# Patient Record
Sex: MALE | Race: WHITE | NOT HISPANIC OR LATINO | URBAN - METROPOLITAN AREA
[De-identification: names, ages, dates, MRNs, and addresses within clinical notes are randomized per-mention and may not be internally consistent; named-entity substitution may affect disease eponyms.]

---

## 2024-04-29 ENCOUNTER — TELEPHONE (OUTPATIENT)
Dept: PRIMARY CARE | Facility: CLINIC | Age: 44
End: 2024-04-29
Payer: COMMERCIAL

## 2024-04-29 NOTE — TELEPHONE ENCOUNTER
Brion called, he stated he bumped into you and you told him to schedule an appt. It has been 3 years since he was last seen, just need a verbal ok from you if that is fine to schedule

## 2024-05-01 ENCOUNTER — OFFICE VISIT (OUTPATIENT)
Dept: PRIMARY CARE | Facility: CLINIC | Age: 44
End: 2024-05-01
Payer: COMMERCIAL

## 2024-05-01 VITALS
OXYGEN SATURATION: 97 % | HEART RATE: 96 BPM | DIASTOLIC BLOOD PRESSURE: 70 MMHG | WEIGHT: 145.6 LBS | BODY MASS INDEX: 24.26 KG/M2 | TEMPERATURE: 98.2 F | SYSTOLIC BLOOD PRESSURE: 116 MMHG | HEIGHT: 65 IN

## 2024-05-01 DIAGNOSIS — K20.90 ESOPHAGITIS: ICD-10-CM

## 2024-05-01 DIAGNOSIS — E78.5 HYPERLIPIDEMIA, UNSPECIFIED HYPERLIPIDEMIA TYPE: ICD-10-CM

## 2024-05-01 DIAGNOSIS — L30.9 ECZEMA, UNSPECIFIED TYPE: ICD-10-CM

## 2024-05-01 DIAGNOSIS — Z00.00 ANNUAL PHYSICAL EXAM: Primary | ICD-10-CM

## 2024-05-01 DIAGNOSIS — Z11.4 ENCOUNTER FOR SCREENING FOR HIV: ICD-10-CM

## 2024-05-01 DIAGNOSIS — Z12.11 COLON CANCER SCREENING: ICD-10-CM

## 2024-05-01 DIAGNOSIS — Z11.59 ENCOUNTER FOR HEPATITIS C SCREENING TEST FOR LOW RISK PATIENT: ICD-10-CM

## 2024-05-01 DIAGNOSIS — Z72.0 NICOTINE ABUSE: ICD-10-CM

## 2024-05-01 DIAGNOSIS — I10 BENIGN ESSENTIAL HYPERTENSION: ICD-10-CM

## 2024-05-01 DIAGNOSIS — K40.90 LEFT INGUINAL HERNIA: ICD-10-CM

## 2024-05-01 PROBLEM — R16.0 HEPATOMEGALY: Status: ACTIVE | Noted: 2024-05-01

## 2024-05-01 PROBLEM — M79.603 ARM PAIN: Status: ACTIVE | Noted: 2024-05-01

## 2024-05-01 PROBLEM — R07.9 CHEST PAIN: Status: ACTIVE | Noted: 2024-05-01

## 2024-05-01 PROBLEM — R53.83 FATIGUE: Status: ACTIVE | Noted: 2024-05-01

## 2024-05-01 PROBLEM — J30.9 ALLERGIC RHINITIS: Status: ACTIVE | Noted: 2024-05-01

## 2024-05-01 PROBLEM — K64.9 HEMORRHOIDS: Status: ACTIVE | Noted: 2024-05-01

## 2024-05-01 PROBLEM — R20.2 PARESTHESIA: Status: ACTIVE | Noted: 2024-05-01

## 2024-05-01 PROBLEM — M54.12 CERVICAL RADICULITIS: Status: ACTIVE | Noted: 2024-05-01

## 2024-05-01 PROBLEM — G44.82 COITAL HEADACHE: Status: ACTIVE | Noted: 2024-05-01

## 2024-05-01 PROBLEM — R51.9 HEADACHE: Status: ACTIVE | Noted: 2024-05-01

## 2024-05-01 PROBLEM — G44.40 MEDICATION OVERUSE HEADACHE: Status: ACTIVE | Noted: 2024-05-01

## 2024-05-01 PROBLEM — G47.00 INSOMNIA: Status: ACTIVE | Noted: 2024-05-01

## 2024-05-01 PROBLEM — F13.10 MILD BENZODIAZEPINE USE DISORDER (MULTI): Status: ACTIVE | Noted: 2024-05-01

## 2024-05-01 PROBLEM — K92.1 BLOOD IN STOOL: Status: ACTIVE | Noted: 2024-05-01

## 2024-05-01 PROBLEM — R10.9 ABDOMINAL PAIN: Status: ACTIVE | Noted: 2024-05-01

## 2024-05-01 PROBLEM — K64.8 INTERNAL HEMORRHOIDS: Status: ACTIVE | Noted: 2024-05-01

## 2024-05-01 PROBLEM — J06.9 VIRAL URI WITH COUGH: Status: ACTIVE | Noted: 2024-05-01

## 2024-05-01 PROBLEM — S16.1XXA CERVICAL STRAIN: Status: ACTIVE | Noted: 2024-05-01

## 2024-05-01 PROBLEM — J32.9 SINUSITIS: Status: ACTIVE | Noted: 2024-05-01

## 2024-05-01 PROBLEM — F41.9 ANXIETY: Status: ACTIVE | Noted: 2024-05-01

## 2024-05-01 PROBLEM — D72.829 LEUKOCYTOSIS: Status: ACTIVE | Noted: 2024-05-01

## 2024-05-01 PROBLEM — K62.5 RECTAL BLEEDING: Status: ACTIVE | Noted: 2024-05-01

## 2024-05-01 PROBLEM — S62.366A CLOSED NONDISPLACED FRACTURE OF NECK OF FIFTH METACARPAL BONE OF RIGHT HAND: Status: ACTIVE | Noted: 2024-05-01

## 2024-05-01 PROBLEM — R41.3 MEMORY LOSS: Status: ACTIVE | Noted: 2024-05-01

## 2024-05-01 PROBLEM — G43.909 MIGRAINE: Status: ACTIVE | Noted: 2024-05-01

## 2024-05-01 PROBLEM — R79.89 ABNORMAL LIVER FUNCTION TEST: Status: ACTIVE | Noted: 2024-05-01

## 2024-05-01 PROBLEM — G89.29 CHRONIC NECK PAIN: Status: ACTIVE | Noted: 2024-05-01

## 2024-05-01 PROBLEM — H65.03 ACUTE SEROUS OTITIS MEDIA, BILATERAL: Status: ACTIVE | Noted: 2024-05-01

## 2024-05-01 PROBLEM — K21.9 GERD (GASTROESOPHAGEAL REFLUX DISEASE): Status: ACTIVE | Noted: 2024-05-01

## 2024-05-01 PROBLEM — R10.30 LOWER ABDOMINAL PAIN OF UNKNOWN ETIOLOGY: Status: ACTIVE | Noted: 2024-05-01

## 2024-05-01 PROBLEM — M54.2 CHRONIC NECK PAIN: Status: ACTIVE | Noted: 2024-05-01

## 2024-05-01 PROBLEM — E78.1 HYPERTRIGLYCERIDEMIA: Status: ACTIVE | Noted: 2024-05-01

## 2024-05-01 PROBLEM — K50.10: Status: ACTIVE | Noted: 2024-05-01

## 2024-05-01 PROBLEM — S69.91XA INJURY OF RIGHT HAND: Status: ACTIVE | Noted: 2024-05-01

## 2024-05-01 PROBLEM — R05.9 COUGH: Status: ACTIVE | Noted: 2024-05-01

## 2024-05-01 PROCEDURE — 99396 PREV VISIT EST AGE 40-64: CPT | Performed by: FAMILY MEDICINE

## 2024-05-01 PROCEDURE — 99214 OFFICE O/P EST MOD 30 MIN: CPT | Performed by: FAMILY MEDICINE

## 2024-05-01 PROCEDURE — 3078F DIAST BP <80 MM HG: CPT | Performed by: FAMILY MEDICINE

## 2024-05-01 PROCEDURE — 3074F SYST BP LT 130 MM HG: CPT | Performed by: FAMILY MEDICINE

## 2024-05-01 RX ORDER — ALCLOMETASONE DIPROPIONATE 0.5 MG/G
CREAM TOPICAL 2 TIMES DAILY
Qty: 45 G | Refills: 2 | Status: SHIPPED | OUTPATIENT
Start: 2024-05-01

## 2024-05-01 ASSESSMENT — ENCOUNTER SYMPTOMS: DEPRESSION: 0

## 2024-05-01 ASSESSMENT — PAIN SCALES - GENERAL: PAINLEVEL: 0-NO PAIN

## 2024-05-01 NOTE — PROGRESS NOTES
"Subjective   Patient ID: Ariel Andujar is a 43 y.o. male who presents for New Patient Visit (Establish care with pcp. ) and Mass (Bump on left side of abdominal for 2-3 months. ).    HPI     Works at [employer].  Bought a campground.    Review of Systems  Cardiovascular: no chest pain, no edema, no palpitations, and no syncope.   Respiratory: no cough,no shortness of breath, and no wheezing  Gastrointestinal: no abdominal pain, nausea, vomiting.  Occasionally gets blood in his stools from his hemorrhoids.  He had a colonoscopy 6 years ago  Genitourinary: no dysuria or hematuria    Objective   /70 (BP Location: Left arm, Patient Position: Sitting, BP Cuff Size: Adult)   Pulse 96   Temp 36.8 °C (98.2 °F) (Temporal)   Ht 1.651 m (5' 5\")   Wt 66 kg (145 lb 9.6 oz)   SpO2 97%   BMI 24.23 kg/m²     Physical Exam  Alert, pleasant and in no acute distress.  Neck: Supple, no JVD or carotid bruit  Heart: Regular rate and rhythm, no murmur  Lungs: Clear to auscultation  Lower extremities: No edema.  Mild excoriation at the lower anterior shin region bilaterally.  Faint erythema and scaling  Abdomen: Soft, nontender without palpable mass    Assessment/Plan   Problem List Items Addressed This Visit             ICD-10-CM    Benign essential hypertension I10    Relevant Orders    Urinalysis with Reflex Microscopic    Comprehensive Metabolic Panel    CBC    Lipid Panel     Other Visit Diagnoses         Codes    Annual physical exam    -  Primary Z00.00    Relevant Orders    Prostate Specific Antigen, Screen    Urinalysis with Reflex Microscopic    Comprehensive Metabolic Panel    CBC    Lipid Panel    Esophagitis     K20.90    Relevant Orders    EGD    Hyperlipidemia, unspecified hyperlipidemia type     E78.5    Relevant Orders    CT cardiac scoring wo IV contrast    Eczema, unspecified type     L30.9    Relevant Medications    alclometasone (Aclovate) 0.05 % cream    Left inguinal hernia     K40.90    Relevant " Orders    Referral to General Surgery    Nicotine abuse     Z72.0    Encounter for hepatitis C screening test for low risk patient     Z11.59    Relevant Orders    Hepatitis C antibody    Encounter for screening for HIV     Z11.4    Relevant Orders    HIV 1/2 Antigen/Antibody Screen with Reflex to Confirmation    Colon cancer screening     Z12.11    Relevant Orders    Colonoscopy Screening; Average Risk Patient        1.  Annual: Care gaps addressed  2.  Hypertension: Has been stable off medication.  3.  Esophagitis: Previous EGD with some esophagitis.  Will set up an EGD to reevaluate.  Patient currently taking as needed antacids.  4.  Hyperlipidemia: History of a negative CT calcium score.  It has been 5 years so we will repeat test  5.  Eczema: Mild eczematous rash on lower extremities.  Aclovate provide  6.  Left inguinal hernia: Referral to surgeon  7.  Nicotine abuse: Discussed the importance of quitting smoking.  Routine labs were ordered.  Will call 3 to 5 days after completion with results.

## 2024-05-08 ENCOUNTER — OFFICE VISIT (OUTPATIENT)
Dept: SURGERY | Facility: CLINIC | Age: 44
End: 2024-05-08
Payer: COMMERCIAL

## 2024-05-08 ENCOUNTER — LAB (OUTPATIENT)
Dept: LAB | Facility: LAB | Age: 44
End: 2024-05-08
Payer: COMMERCIAL

## 2024-05-08 VITALS
RESPIRATION RATE: 16 BRPM | SYSTOLIC BLOOD PRESSURE: 150 MMHG | OXYGEN SATURATION: 94 % | BODY MASS INDEX: 24.46 KG/M2 | HEIGHT: 65 IN | DIASTOLIC BLOOD PRESSURE: 97 MMHG | HEART RATE: 81 BPM | TEMPERATURE: 97.7 F | WEIGHT: 146.8 LBS

## 2024-05-08 DIAGNOSIS — Z00.00 ANNUAL PHYSICAL EXAM: ICD-10-CM

## 2024-05-08 DIAGNOSIS — I10 BENIGN ESSENTIAL HYPERTENSION: ICD-10-CM

## 2024-05-08 DIAGNOSIS — K40.90 LEFT INGUINAL HERNIA: Primary | ICD-10-CM

## 2024-05-08 DIAGNOSIS — Z11.59 ENCOUNTER FOR HEPATITIS C SCREENING TEST FOR LOW RISK PATIENT: ICD-10-CM

## 2024-05-08 DIAGNOSIS — Z11.4 ENCOUNTER FOR SCREENING FOR HIV: ICD-10-CM

## 2024-05-08 LAB
ALBUMIN SERPL BCP-MCNC: 4.7 G/DL (ref 3.4–5)
ALP SERPL-CCNC: 79 U/L (ref 33–120)
ALT SERPL W P-5'-P-CCNC: 17 U/L (ref 10–52)
ANION GAP SERPL CALC-SCNC: 14 MMOL/L (ref 10–20)
APPEARANCE UR: CLEAR
AST SERPL W P-5'-P-CCNC: 21 U/L (ref 9–39)
BILIRUB SERPL-MCNC: 0.7 MG/DL (ref 0–1.2)
BILIRUB UR STRIP.AUTO-MCNC: NEGATIVE MG/DL
BUN SERPL-MCNC: 11 MG/DL (ref 6–23)
CALCIUM SERPL-MCNC: 9.6 MG/DL (ref 8.6–10.3)
CHLORIDE SERPL-SCNC: 103 MMOL/L (ref 98–107)
CHOLEST SERPL-MCNC: 208 MG/DL (ref 0–199)
CHOLESTEROL/HDL RATIO: 3.7
CO2 SERPL-SCNC: 26 MMOL/L (ref 21–32)
COLOR UR: COLORLESS
CREAT SERPL-MCNC: 0.85 MG/DL (ref 0.5–1.3)
EGFRCR SERPLBLD CKD-EPI 2021: >90 ML/MIN/1.73M*2
ERYTHROCYTE [DISTWIDTH] IN BLOOD BY AUTOMATED COUNT: 11.9 % (ref 11.5–14.5)
GLUCOSE SERPL-MCNC: 83 MG/DL (ref 74–99)
GLUCOSE UR STRIP.AUTO-MCNC: NORMAL MG/DL
HCT VFR BLD AUTO: 45.7 % (ref 41–52)
HDLC SERPL-MCNC: 55.6 MG/DL
HGB BLD-MCNC: 15.8 G/DL (ref 13.5–17.5)
KETONES UR STRIP.AUTO-MCNC: NEGATIVE MG/DL
LDLC SERPL CALC-MCNC: 126 MG/DL
LEUKOCYTE ESTERASE UR QL STRIP.AUTO: NEGATIVE
MCH RBC QN AUTO: 33.2 PG (ref 26–34)
MCHC RBC AUTO-ENTMCNC: 34.6 G/DL (ref 32–36)
MCV RBC AUTO: 96 FL (ref 80–100)
NITRITE UR QL STRIP.AUTO: NEGATIVE
NON HDL CHOLESTEROL: 152 MG/DL (ref 0–149)
NRBC BLD-RTO: 0 /100 WBCS (ref 0–0)
PH UR STRIP.AUTO: 7 [PH]
PLATELET # BLD AUTO: 214 X10*3/UL (ref 150–450)
POTASSIUM SERPL-SCNC: 4.6 MMOL/L (ref 3.5–5.3)
PROT SERPL-MCNC: 7.2 G/DL (ref 6.4–8.2)
PROT UR STRIP.AUTO-MCNC: NEGATIVE MG/DL
PSA SERPL-MCNC: 1.18 NG/ML
RBC # BLD AUTO: 4.76 X10*6/UL (ref 4.5–5.9)
RBC # UR STRIP.AUTO: NEGATIVE /UL
SODIUM SERPL-SCNC: 138 MMOL/L (ref 136–145)
SP GR UR STRIP.AUTO: 1.01
TRIGL SERPL-MCNC: 131 MG/DL (ref 0–149)
UROBILINOGEN UR STRIP.AUTO-MCNC: NORMAL MG/DL
VLDL: 26 MG/DL (ref 0–40)
WBC # BLD AUTO: 13.3 X10*3/UL (ref 4.4–11.3)

## 2024-05-08 PROCEDURE — 3077F SYST BP >= 140 MM HG: CPT | Performed by: SURGERY

## 2024-05-08 PROCEDURE — 99203 OFFICE O/P NEW LOW 30 MIN: CPT | Performed by: SURGERY

## 2024-05-08 PROCEDURE — 86803 HEPATITIS C AB TEST: CPT

## 2024-05-08 PROCEDURE — 80053 COMPREHEN METABOLIC PANEL: CPT

## 2024-05-08 PROCEDURE — 87389 HIV-1 AG W/HIV-1&-2 AB AG IA: CPT

## 2024-05-08 PROCEDURE — 3080F DIAST BP >= 90 MM HG: CPT | Performed by: SURGERY

## 2024-05-08 PROCEDURE — 80061 LIPID PANEL: CPT

## 2024-05-08 PROCEDURE — 85027 COMPLETE CBC AUTOMATED: CPT

## 2024-05-08 PROCEDURE — 36415 COLL VENOUS BLD VENIPUNCTURE: CPT

## 2024-05-08 PROCEDURE — 81003 URINALYSIS AUTO W/O SCOPE: CPT

## 2024-05-08 PROCEDURE — 84153 ASSAY OF PSA TOTAL: CPT

## 2024-05-08 RX ORDER — CEFAZOLIN SODIUM 2 G/100ML
2 INJECTION, SOLUTION INTRAVENOUS ONCE
OUTPATIENT
Start: 2024-05-08 | End: 2024-05-08

## 2024-05-08 RX ORDER — SODIUM CHLORIDE, SODIUM LACTATE, POTASSIUM CHLORIDE, CALCIUM CHLORIDE 600; 310; 30; 20 MG/100ML; MG/100ML; MG/100ML; MG/100ML
100 INJECTION, SOLUTION INTRAVENOUS CONTINUOUS
OUTPATIENT
Start: 2024-05-08

## 2024-05-08 NOTE — PROGRESS NOTES
History Of Present Illness  HPI   The patient is a 43-year-old male referred by his primary care physician for a left inguinal hernia.  The patient noticed a left inguinal lump about 4 months ago.  The lump has been reducible.  He has mild intermittent discomfort.  No prior hernia repair.    Past medical history:  Right knee torn meniscus  Foot operation    Tobacco: 1 pack/day.  I advised him to stop smoking.  Allergies: Vicodin twice caused him to pass out.    Past Medical History  He has a past medical history of Anxiety disorder, unspecified, Encounter for other general counseling and advice on contraception, Ganglion, unspecified site, Other conditions influencing health status, Other conditions influencing health status, Pain in unspecified knee, Personal history of other diseases of male genital organs, Personal history of other diseases of the circulatory system, Personal history of other diseases of the digestive system, Personal history of other diseases of the digestive system, Personal history of other diseases of the musculoskeletal system and connective tissue, Personal history of other diseases of the nervous system and sense organs, Personal history of other diseases of the respiratory system, Personal history of other mental and behavioral disorders, Personal history of other specified conditions, Personal history of other specified conditions, and Sprain of ligaments of thoracic spine, initial encounter.    Surgical History  He has a past surgical history that includes Foot surgery (10/21/2014).     Allergies  Hydrocodone-acetaminophen    Social History  He reports that he has been smoking cigarettes. He has never used smokeless tobacco. He reports current alcohol use. He reports that he does not use drugs.    Family History  No family history on file.    Review of Systems  Review of Systems:  Constitutional:  no fever, no chills, no significant weight change  Neurological: No history of CVA or  "seizure disorder  Eyes: No pain, no recent visual change  ENT:  No recent hearing loss  Neck: No pain  Cardiovascular: No chest pain, no history of cardiac disease such as myocardial infarction or arrhythmia or congestive heart failure  Pulmonary: No shortness of breath, no history of pulmonary disease such as pneumonia or COPD  Breast: No history of breast disease or breast mass  Gastrointestinal:   no abdominal pain, no nausea or vomiting, no constipation or diarrhea or blood in the stool.  No history of ulcers.  No liver, gallbladder or pancreas disease.  No intestinal disorder.  Genitourinary: No hematuria or dysuria, no kidney disease  Musculoskeletal:  no arthralgia, no muscle or bone pain  Integumentary:  no rash  Psychiatric:  No anxiety or depression  Endocrine:  no history of diabetes  Hematologic/Lymphatic: No easy bruising or bleeding          Last Recorded Vitals  Blood pressure (!) 150/97, pulse 81, temperature 36.5 °C (97.7 °F), temperature source Temporal, resp. rate 16, height 1.651 m (5' 5\"), weight 66.6 kg (146 lb 12.8 oz), SpO2 94%.    Physical Exam  Constitutional: Well-developed, well-nourished, alert and oriented, no acute distress  Skin: Warm and dry, no lesions, no rashes, no jaundice  HEENT: Normocephalic, atraumatic, EOMI, no scleral icterus, eyes have no redness or swelling or discharge, external inspection of ears and nose is normal, mucous membranes moist  Neck: Soft, nontender, no mass or adenopathy  Cardiac: Regular rate and rhythm, no murmur  Chest: Patent airway, clear to auscultation, normal breath sounds with good chest expansion, no wheezes or rales or rhonchi noted, thorax symmetric  Abdomen: Nondistended, positive bowel sounds, soft, nontender, no mass.  Left inguinal hernia, reducible.  No right inguinal or femoral hernias palpable  Rectal: Not performed  Extremities: No injury, no lower extremity edema or calf tenderness  Lymphatic: No cervical adenopathy  Musculoskeletal: " Range of motion intact, no joint swelling, normal strength  Neurological: Alert and oriented x3, intact sensory and motor function, no obvious focal neurologic abnormalities, normal gait  Psychological: Appropriate mood and behavior  Patient declined a chaperone    Relevant Results       Assessment/Plan   Diagnoses and all orders for this visit:  Left inguinal hernia  -     Referral to General Surgery  Left inguinal hernia which is reducible, but symptomatic.  Recommend left inguinal herniorrhaphy with mesh either open or robotic assisted laparoscopic.  I discussed the procedure and risks with the patient. The risks include bleeding, infection, mesh infection, recurrence, injury to surrounding structures such as nerves/blood vessels/intestine/bladder, chronic postop pain, cardiac/pulmonary complications.   If the mesh becomes infected it could require excision.  I discussed the alternative of hernia repair without mesh and observation.  The patient wishes to have open repair of the left inguinal hernia with mesh.  Electronic consent signed.        Carlos Peraza MD

## 2024-05-08 NOTE — LETTER
May 8, 2024     Harley Garnica DO  [address]  [address]  [facility] [address]    Patient: Ariel Andujar   YOB: 1980   Date of Visit: 5/8/2024       Dear Dr. Harley Garnica DO:    Thank you for referring Ariel Andujar to me for evaluation. Below are my notes for this consultation.  If you have questions, please do not hesitate to call me. I look forward to following your patient along with you.       Sincerely,     Carlos Peraza MD      CC: No Recipients  ______________________________________________________________________________________    History Of Present Illness  HPI   The patient is a 43-year-old male referred by his primary care physician for a left inguinal hernia.  The patient noticed a left inguinal lump about 4 months ago.  The lump has been reducible.  He has mild intermittent discomfort.  No prior hernia repair.    Past medical history:  Right knee torn meniscus  Foot operation    Tobacco: 1 pack/day.  I advised him to stop smoking.  Allergies: Vicodin twice caused him to pass out.    Past Medical History  He has a past medical history of Anxiety disorder, unspecified, Encounter for other general counseling and advice on contraception, Ganglion, unspecified site, Other conditions influencing health status, Other conditions influencing health status, Pain in unspecified knee, Personal history of other diseases of male genital organs, Personal history of other diseases of the circulatory system, Personal history of other diseases of the digestive system, Personal history of other diseases of the digestive system, Personal history of other diseases of the musculoskeletal system and connective tissue, Personal history of other diseases of the nervous system and sense organs, Personal history of other diseases of the respiratory system, Personal history of other mental and behavioral disorders, Personal history of other specified conditions, Personal history of  "other specified conditions, and Sprain of ligaments of thoracic spine, initial encounter.    Surgical History  He has a past surgical history that includes Foot surgery (10/21/2014).     Allergies  Hydrocodone-acetaminophen    Social History  He reports that he has been smoking cigarettes. He has never used smokeless tobacco. He reports current alcohol use. He reports that he does not use drugs.    Family History  No family history on file.    Review of Systems  Review of Systems:  Constitutional:  no fever, no chills, no significant weight change  Neurological: No history of CVA or seizure disorder  Eyes: No pain, no recent visual change  ENT:  No recent hearing loss  Neck: No pain  Cardiovascular: No chest pain, no history of cardiac disease such as myocardial infarction or arrhythmia or congestive heart failure  Pulmonary: No shortness of breath, no history of pulmonary disease such as pneumonia or COPD  Breast: No history of breast disease or breast mass  Gastrointestinal:   no abdominal pain, no nausea or vomiting, no constipation or diarrhea or blood in the stool.  No history of ulcers.  No liver, gallbladder or pancreas disease.  No intestinal disorder.  Genitourinary: No hematuria or dysuria, no kidney disease  Musculoskeletal:  no arthralgia, no muscle or bone pain  Integumentary:  no rash  Psychiatric:  No anxiety or depression  Endocrine:  no history of diabetes  Hematologic/Lymphatic: No easy bruising or bleeding          Last Recorded Vitals  Blood pressure (!) 150/97, pulse 81, temperature 36.5 °C (97.7 °F), temperature source Temporal, resp. rate 16, height 1.651 m (5' 5\"), weight 66.6 kg (146 lb 12.8 oz), SpO2 94%.    Physical Exam  Constitutional: Well-developed, well-nourished, alert and oriented, no acute distress  Skin: Warm and dry, no lesions, no rashes, no jaundice  HEENT: Normocephalic, atraumatic, EOMI, no scleral icterus, eyes have no redness or swelling or discharge, external inspection of " ears and nose is normal, mucous membranes moist  Neck: Soft, nontender, no mass or adenopathy  Cardiac: Regular rate and rhythm, no murmur  Chest: Patent airway, clear to auscultation, normal breath sounds with good chest expansion, no wheezes or rales or rhonchi noted, thorax symmetric  Abdomen: Nondistended, positive bowel sounds, soft, nontender, no mass.  Left inguinal hernia, reducible.  No right inguinal or femoral hernias palpable  Rectal: Not performed  Extremities: No injury, no lower extremity edema or calf tenderness  Lymphatic: No cervical adenopathy  Musculoskeletal: Range of motion intact, no joint swelling, normal strength  Neurological: Alert and oriented x3, intact sensory and motor function, no obvious focal neurologic abnormalities, normal gait  Psychological: Appropriate mood and behavior  Patient declined a chaperone    Relevant Results       Assessment/Plan  Diagnoses and all orders for this visit:  Left inguinal hernia  -     Referral to General Surgery  Left inguinal hernia which is reducible, but symptomatic.  Recommend left inguinal herniorrhaphy with mesh either open or robotic assisted laparoscopic.  I discussed the procedure and risks with the patient. The risks include bleeding, infection, mesh infection, recurrence, injury to surrounding structures such as nerves/blood vessels/intestine/bladder, chronic postop pain, cardiac/pulmonary complications.   If the mesh becomes infected it could require excision.  I discussed the alternative of hernia repair without mesh and observation.  The patient wishes to have open repair of the left inguinal hernia with mesh.  Electronic consent signed.        Carlos Peraza MD

## 2024-05-09 LAB
HCV AB SER QL: NONREACTIVE
HIV 1+2 AB+HIV1 P24 AG SERPL QL IA: NONREACTIVE

## 2024-06-16 ENCOUNTER — APPOINTMENT (OUTPATIENT)
Dept: RADIOLOGY | Facility: HOSPITAL | Age: 44
End: 2024-06-16
Payer: COMMERCIAL

## 2024-07-01 DIAGNOSIS — I10 BENIGN ESSENTIAL HYPERTENSION: Primary | ICD-10-CM

## 2024-07-01 RX ORDER — VALSARTAN AND HYDROCHLOROTHIAZIDE 160; 25 MG/1; MG/1
1 TABLET ORAL DAILY
Qty: 90 TABLET | Refills: 1 | Status: SHIPPED | OUTPATIENT
Start: 2024-07-01 | End: 2024-12-28

## 2024-07-11 ENCOUNTER — APPOINTMENT (OUTPATIENT)
Dept: GASTROENTEROLOGY | Facility: CLINIC | Age: 44
End: 2024-07-11
Payer: COMMERCIAL

## 2024-07-11 VITALS
HEART RATE: 77 BPM | SYSTOLIC BLOOD PRESSURE: 149 MMHG | HEIGHT: 65 IN | DIASTOLIC BLOOD PRESSURE: 96 MMHG | WEIGHT: 146.4 LBS | BODY MASS INDEX: 24.39 KG/M2

## 2024-07-11 DIAGNOSIS — K57.90 DIVERTICULOSIS: ICD-10-CM

## 2024-07-11 DIAGNOSIS — K21.9 GASTROESOPHAGEAL REFLUX DISEASE, UNSPECIFIED WHETHER ESOPHAGITIS PRESENT: Primary | ICD-10-CM

## 2024-07-11 DIAGNOSIS — K62.5 RECTAL BLEEDING: ICD-10-CM

## 2024-07-11 PROCEDURE — 3080F DIAST BP >= 90 MM HG: CPT | Performed by: INTERNAL MEDICINE

## 2024-07-11 PROCEDURE — 3077F SYST BP >= 140 MM HG: CPT | Performed by: INTERNAL MEDICINE

## 2024-07-11 PROCEDURE — 3008F BODY MASS INDEX DOCD: CPT | Performed by: INTERNAL MEDICINE

## 2024-07-11 PROCEDURE — 99203 OFFICE O/P NEW LOW 30 MIN: CPT | Performed by: INTERNAL MEDICINE

## 2024-07-11 NOTE — PROGRESS NOTES
This 43-year-old man is here because of 2 sets of problems he gets constant heartburn he is to take Prilosec then he was worried about side effects and discontinued now he takes Tums all the time.  He has no dysphagia anorexia or weight loss.  He had an upper endoscopy 6 years ago.    He also has rectal bleeding he bleeds at least 3 times a week.  This is mostly when he is moving his bowels which is mixed with blood.  6 years ago he had an episode of diverticulitis subsequently had a colonoscopy which showed widemouth diverticuli in the sigmoid colon and area of inflammation in the sigmoid colon suggestive of diverticulitis.  He does take fiber tablets on daily basis.  He is rectal bleeding is mostly when he is moving his bowels and sometimes he sits in the toilet for 20 to 25 minutes because of discomfort and bleeding.  He is here for evaluation.  Past medical history no major surgical procedures.  He is scheduled to have a left inguinal hernia repair by Dr. Peraza.  Social history he is  with children he smokes a pack of cigarettes per day he is a weekend drinker.  Family history his father  of cirrhosis of the liver recently.  No family history of colorectal cancer.    Review of system he feels his blood pressure was high he went to the emergency room and released he is taking blood pressure medications.  He drinks over the weekend he is a 1 pack smoker.  Does not abuse any drugs.  He has no cough or sputum production.  He has no urinary symptoms he is generally anxious otherwise 10 point review of system is negative.  He is allergic to hydrocodone.    Physical examination vital signs was noted.  HEENT normal.  Carotid 2+ thyroid is not enlarged no adenopathy.  Oral cavity is well-hydrated.    Heart sounds were normal chest showed occasional rhonchi bilaterally abdominal examination shows no tenderness rigidity or guarding he is not ill he has a left inguinal hernia which is reducible.  Mental status  anxious.    Clinical impression rectal rectal bleeding most likely from hemorrhoids less likely from diverticulosis or adenoma.    2.  Gastroesophageal reflux disease  3.  Tobacco abuse.    Recommendation recommend that he should take Prilosec 20 mg once daily.  He should seriously consider cessation of smoking and drinking.  Which is aggravating his gastrointestinal problems.  He is also recommended to an upper GI endoscopy as well as colonoscopy for further evaluation of his symptoms.  Informed consent was obtained.  He is also advised to take high-fiber cereal every day in addition to fresh fruits and vegetables.

## 2024-07-11 NOTE — PROGRESS NOTES
his 43-year-old man is here because of 2 sets of problems he gets constant heartburn he is to take Prilosec then he was worried about side effects and discontinued now he takes Tums all the time.  He has no dysphagia anorexia or weight loss.  He had an upper endoscopy 6 years ago.     He also has rectal bleeding he bleeds at least 3 times a week.  This is mostly when he is moving his bowels which is mixed with blood.  6 years ago he had an episode of diverticulitis subsequently had a colonoscopy which showed widemouth diverticuli in the sigmoid colon and area of inflammation in the sigmoid colon suggestive of diverticulitis.  He does take fiber tablets on daily basis.  He is rectal bleeding is mostly when he is moving his bowels and sometimes he sits in the toilet for 20 to 25 minutes because of discomfort and bleeding.  He is here for evaluation.  Past medical history no major surgical procedures.  He is scheduled to have a left inguinal hernia repair by Dr. Peraza.  Social history he is  with children he smokes a pack of cigarettes per day he is a weekend drinker.  Family history his father  of cirrhosis of the liver recently.  No family history of colorectal cancer.     Review of system he feels his blood pressure was high he went to the emergency room and released he is taking blood pressure medications.  He drinks over the weekend he is a 1 pack smoker.  Does not abuse any drugs.  He has no cough or sputum production.  He has no urinary symptoms he is generally anxious otherwise 10 point review of system is negative.  He is allergic to hydrocodone.     Physical examination vital signs was noted.  HEENT normal.  Carotid 2+ thyroid is not enlarged no adenopathy.  Oral cavity is well-hydrated.     Heart sounds were normal chest showed occasional rhonchi bilaterally abdominal examination shows no tenderness rigidity or guarding he is not ill he has a left inguinal hernia which is reducible.  Mental  status anxious.     Clinical impression rectal rectal bleeding most likely from hemorrhoids less likely from diverticulosis or adenoma.     2.  Gastroesophageal reflux disease  3.  Tobacco abuse.     Recommendation recommend that he should take Prilosec 20 mg once daily.  He should seriously consider cessation of smoking and drinking.  Which is aggravating his gastrointestinal problems.  He is also recommended to an upper GI endoscopy as well as colonoscopy for further evaluation of his symptoms.  Informed consent was obtained.  He is also advised to take high-fiber cereal every day in addition to fresh fruits and vegetables.

## 2024-07-12 NOTE — H&P (VIEW-ONLY)
History Of Present Illness  HPI   May 8, 2024  The patient is a 43-year-old male referred by his primary care physician for a left inguinal hernia.  The patient noticed a left inguinal lump about 4 months ago.  The lump has been reducible.  He has mild intermittent discomfort.  No prior hernia repair.     July 15, 2024  The patient was going to wait until October to have his hernia repaired, but now has worsening left inguinal burning pain and wishes to schedule the operation sooner.  He has hypertension for which he was recently started on antihypertensive medications.    Past medical history:  Right knee torn meniscus  Foot operation     Tobacco: 1 pack/day.  I advised him to stop smoking.  Allergies: Vicodin twice caused him to pass out.     Past Medical History  He has a past medical history of Anxiety disorder, unspecified, Encounter for other general counseling and advice on contraception, Ganglion, unspecified site, Other conditions influencing health status, Other conditions influencing health status, Pain in unspecified knee, Personal history of other diseases of male genital organs, Personal history of other diseases of the circulatory system, Personal history of other diseases of the digestive system, Personal history of other diseases of the digestive system, Personal history of other diseases of the musculoskeletal system and connective tissue, Personal history of other diseases of the nervous system and sense organs, Personal history of other diseases of the respiratory system, Personal history of other mental and behavioral disorders, Personal history of other specified conditions, Personal history of other specified conditions, and Sprain of ligaments of thoracic spine, initial encounter.    Surgical History  He has a past surgical history that includes Foot surgery (10/21/2014).     Allergies  Hydrocodone-acetaminophen    Social History  He reports that he has been smoking cigarettes. He started  "smoking about 26 years ago. He has a 26.5 pack-year smoking history. He has never used smokeless tobacco. He reports current alcohol use. He reports current drug use. Drug: Marijuana.    Family History  Family History   Problem Relation Name Age of Onset    Pancreatic cancer Paternal Grandmother       Last Recorded Vitals  Blood pressure 130/80, pulse 87, temperature 36.7 °C (98.1 °F), temperature source Temporal, resp. rate 14, height 1.651 m (5' 5\"), weight 67.4 kg (148 lb 9.6 oz), SpO2 98%.    Physical Exam  Constitutional: Well-developed, well-nourished, alert and oriented, no acute distress  Skin: Warm and dry, no lesions, no rashes, no jaundice  HEENT: Normocephalic, atraumatic, EOMI, no scleral icterus, eyes have no redness or swelling or discharge, external inspection of ears and nose is normal, mucous membranes moist  Neck: Soft, nontender, no mass or adenopathy  Cardiac: Regular rate and rhythm, no murmur  Chest: Patent airway, clear to auscultation, normal breath sounds with good chest expansion, no wheezes or rales or rhonchi noted, thorax symmetric  Abdomen: Nondistended, positive bowel sounds, soft, nontender, no mass  Left inguinal hernia, reducible.  No femoral hernias or right inguinal hernia palpable.  Rectal: Not performed  Extremities: No injury, no lower extremity edema or calf tenderness  Lymphatic: No cervical adenopathy  Musculoskeletal: Range of motion intact, no joint swelling, normal strength  Neurological: Alert and oriented x3, intact sensory and motor function, no obvious focal neurologic abnormalities, normal gait  Psychological: Appropriate mood and behavior  Patient declined a chaperone    Relevant Results       Assessment/Plan   Diagnoses and all orders for this visit:  Left inguinal hernia      Left inguinal hernia which is reducible, but symptomatic.  Recommend left inguinal herniorrhaphy with mesh either open or robotic assisted laparoscopic.  I again discussed the procedure and " risks with the patient. The risks include bleeding, infection, mesh infection, recurrence, injury to surrounding structures such as nerves/blood vessels/intestine/bladder, chronic postop pain, cardiac/pulmonary complications.   If the mesh becomes infected it could require excision.  I have discussed the alternative of hernia repair without mesh and observation.  The patient wishes to have open repair of the left inguinal hernia with mesh.  Electronic consent has been signed.    Carlos Peraza MD

## 2024-07-15 ENCOUNTER — OFFICE VISIT (OUTPATIENT)
Dept: SURGERY | Facility: CLINIC | Age: 44
End: 2024-07-15
Payer: COMMERCIAL

## 2024-07-15 ENCOUNTER — APPOINTMENT (OUTPATIENT)
Dept: PRIMARY CARE | Facility: CLINIC | Age: 44
End: 2024-07-15
Payer: COMMERCIAL

## 2024-07-15 VITALS
RESPIRATION RATE: 14 BRPM | HEART RATE: 87 BPM | DIASTOLIC BLOOD PRESSURE: 80 MMHG | HEIGHT: 65 IN | BODY MASS INDEX: 24.76 KG/M2 | TEMPERATURE: 98.1 F | OXYGEN SATURATION: 98 % | WEIGHT: 148.6 LBS | SYSTOLIC BLOOD PRESSURE: 130 MMHG

## 2024-07-15 VITALS
HEART RATE: 98 BPM | TEMPERATURE: 97.5 F | BODY MASS INDEX: 24.4 KG/M2 | DIASTOLIC BLOOD PRESSURE: 80 MMHG | OXYGEN SATURATION: 96 % | WEIGHT: 146.6 LBS | SYSTOLIC BLOOD PRESSURE: 130 MMHG

## 2024-07-15 DIAGNOSIS — R07.89 OTHER CHEST PAIN: ICD-10-CM

## 2024-07-15 DIAGNOSIS — F41.9 ANXIETY: Primary | ICD-10-CM

## 2024-07-15 DIAGNOSIS — K40.90 LEFT INGUINAL HERNIA: Primary | ICD-10-CM

## 2024-07-15 PROCEDURE — 3079F DIAST BP 80-89 MM HG: CPT | Performed by: FAMILY MEDICINE

## 2024-07-15 PROCEDURE — 99214 OFFICE O/P EST MOD 30 MIN: CPT | Performed by: FAMILY MEDICINE

## 2024-07-15 PROCEDURE — 99213 OFFICE O/P EST LOW 20 MIN: CPT | Performed by: SURGERY

## 2024-07-15 PROCEDURE — 3075F SYST BP GE 130 - 139MM HG: CPT | Performed by: FAMILY MEDICINE

## 2024-07-15 PROCEDURE — 3079F DIAST BP 80-89 MM HG: CPT | Performed by: SURGERY

## 2024-07-15 PROCEDURE — 3075F SYST BP GE 130 - 139MM HG: CPT | Performed by: SURGERY

## 2024-07-15 RX ORDER — BUSPIRONE HYDROCHLORIDE 10 MG/1
10 TABLET ORAL 3 TIMES DAILY
Qty: 90 TABLET | Refills: 5 | Status: SHIPPED | OUTPATIENT
Start: 2024-07-15 | End: 2025-01-11

## 2024-07-15 ASSESSMENT — PAIN SCALES - GENERAL: PAINLEVEL: 4

## 2024-07-15 ASSESSMENT — ENCOUNTER SYMPTOMS: DEPRESSION: 0

## 2024-07-15 NOTE — LETTER
July 15, 2024     Harley Garnica DO  [address]  [address]  [facility] [address]    Patient: Ariel Andujar   YOB: 1980   Date of Visit: 7/15/2024       Dear Dr. Harley Garnica DO:    Thank you for referring Ariel Andujar to me for evaluation. Below are my notes for this consultation.  If you have questions, please do not hesitate to call me. I look forward to following your patient along with you.       Sincerely,     Carlos Peraza MD      CC: No Recipients  ______________________________________________________________________________________    History Of Present Illness  HPI   May 8, 2024  The patient is a 43-year-old male referred by his primary care physician for a left inguinal hernia.  The patient noticed a left inguinal lump about 4 months ago.  The lump has been reducible.  He has mild intermittent discomfort.  No prior hernia repair.     July 15, 2024  The patient was going to wait until October to have his hernia repaired, but now has worsening left inguinal burning pain and wishes to schedule the operation sooner.  He has hypertension for which he was recently started on antihypertensive medications.    Past medical history:  Right knee torn meniscus  Foot operation     Tobacco: 1 pack/day.  I advised him to stop smoking.  Allergies: Vicodin twice caused him to pass out.     Past Medical History  He has a past medical history of Anxiety disorder, unspecified, Encounter for other general counseling and advice on contraception, Ganglion, unspecified site, Other conditions influencing health status, Other conditions influencing health status, Pain in unspecified knee, Personal history of other diseases of male genital organs, Personal history of other diseases of the circulatory system, Personal history of other diseases of the digestive system, Personal history of other diseases of the digestive system, Personal history of other diseases of the musculoskeletal  "system and connective tissue, Personal history of other diseases of the nervous system and sense organs, Personal history of other diseases of the respiratory system, Personal history of other mental and behavioral disorders, Personal history of other specified conditions, Personal history of other specified conditions, and Sprain of ligaments of thoracic spine, initial encounter.    Surgical History  He has a past surgical history that includes Foot surgery (10/21/2014).     Allergies  Hydrocodone-acetaminophen    Social History  He reports that he has been smoking cigarettes. He started smoking about 26 years ago. He has a 26.5 pack-year smoking history. He has never used smokeless tobacco. He reports current alcohol use. He reports current drug use. Drug: Marijuana.    Family History  Family History   Problem Relation Name Age of Onset   • Pancreatic cancer Paternal Grandmother       Last Recorded Vitals  Blood pressure 130/80, pulse 87, temperature 36.7 °C (98.1 °F), temperature source Temporal, resp. rate 14, height 1.651 m (5' 5\"), weight 67.4 kg (148 lb 9.6 oz), SpO2 98%.    Physical Exam  Constitutional: Well-developed, well-nourished, alert and oriented, no acute distress  Skin: Warm and dry, no lesions, no rashes, no jaundice  HEENT: Normocephalic, atraumatic, EOMI, no scleral icterus, eyes have no redness or swelling or discharge, external inspection of ears and nose is normal, mucous membranes moist  Neck: Soft, nontender, no mass or adenopathy  Cardiac: Regular rate and rhythm, no murmur  Chest: Patent airway, clear to auscultation, normal breath sounds with good chest expansion, no wheezes or rales or rhonchi noted, thorax symmetric  Abdomen: Nondistended, positive bowel sounds, soft, nontender, no mass  Left inguinal hernia, reducible.  No femoral hernias or right inguinal hernia palpable.  Rectal: Not performed  Extremities: No injury, no lower extremity edema or calf tenderness  Lymphatic: No " cervical adenopathy  Musculoskeletal: Range of motion intact, no joint swelling, normal strength  Neurological: Alert and oriented x3, intact sensory and motor function, no obvious focal neurologic abnormalities, normal gait  Psychological: Appropriate mood and behavior  Patient declined a chaperone    Relevant Results       Assessment/Plan  Diagnoses and all orders for this visit:  Left inguinal hernia      Left inguinal hernia which is reducible, but symptomatic.  Recommend left inguinal herniorrhaphy with mesh either open or robotic assisted laparoscopic.  I again discussed the procedure and risks with the patient. The risks include bleeding, infection, mesh infection, recurrence, injury to surrounding structures such as nerves/blood vessels/intestine/bladder, chronic postop pain, cardiac/pulmonary complications.   If the mesh becomes infected it could require excision.  I have discussed the alternative of hernia repair without mesh and observation.  The patient wishes to have open repair of the left inguinal hernia with mesh.  Electronic consent has been signed.    Carlos Peraza MD

## 2024-07-15 NOTE — PROGRESS NOTES
Subjective   Patient ID: Ariel Andujar is a 43 y.o. male who presents for Follow-up (Emergency room follow up for tingling in both arms and chest feels heaving for over a week now. ).    HPI   Follow up ER  Felt hands tingle for a week.  Then got chest pain and passed out while driving.  ER workup was negative.  Has a long history of anxiety.  He feels his life is more stable so not clear why he is having anxiety.  He has not had any syncopal or presyncopal symptoms since event.  No palpitations.  No more chest pain.  CT cardiac score 0, 5 years ago  Review of Systems    Objective   /80 (BP Location: Left arm, Patient Position: Sitting, BP Cuff Size: Adult)   Pulse 98   Temp 36.4 °C (97.5 °F) (Temporal)   Wt 66.5 kg (146 lb 9.6 oz)   SpO2 96%   BMI 24.40 kg/m²     Physical Exam  Alert, pleasant and in no acute distress.  Heart: Regular rate and rhythm without murmur  Lungs: Clear to auscultation  Lower extremities: No edema  Assessment/Plan   Problem List Items Addressed This Visit             ICD-10-CM    Anxiety - Primary F41.9    Relevant Medications    busPIRone (Buspar) 10 mg tablet    Other Relevant Orders    EMG & nerve conduction    Chest pain R07.9   Patient for follow-up after ER evaluation.  I believe this is anxiety based.  We are going to start buspirone therapy.  Plan follow-up in 1 month.  As a side note complex patient has been having discomfort in his groin secondary to his hernia.  We will contact his surgeon to see if we can move up his re-evaluation date

## 2024-07-15 NOTE — Clinical Note
July 15, 2024       No Recipients    Patient: Ariel Andujar   YOB: 1980   Date of Visit: 7/15/2024       Dear Dr. Shi Recipients:    Thank you for referring Ariel Andujar to me for evaluation. Below are my notes for this consultation.  If you have questions, please do not hesitate to call me. I look forward to following your patient along with you.       Sincerely,     Carlos Peraza MD      CC:   No Recipients  ______________________________________________________________________________________    History Of Present Illness  HPI   May 8, 2024  The patient is a 43-year-old male referred by his primary care physician for a left inguinal hernia.  The patient noticed a left inguinal lump about 4 months ago.  The lump has been reducible.  He has mild intermittent discomfort.  No prior hernia repair.     July 15, 2024      Past medical history:  Right knee torn meniscus  Foot operation     Tobacco: 1 pack/day.  I advised him to stop smoking.  Allergies: Vicodin twice caused him to pass out.     Past Medical History  He has a past medical history of Anxiety disorder, unspecified, Encounter for other general counseling and advice on contraception, Ganglion, unspecified site, Other conditions influencing health status, Other conditions influencing health status, Pain in unspecified knee, Personal history of other diseases of male genital organs, Personal history of other diseases of the circulatory system, Personal history of other diseases of the digestive system, Personal history of other diseases of the digestive system, Personal history of other diseases of the musculoskeletal system and connective tissue, Personal history of other diseases of the nervous system and sense organs, Personal history of other diseases of the respiratory system, Personal history of other mental and behavioral disorders, Personal history of other specified conditions, Personal history of other specified conditions, and  Sprain of ligaments of thoracic spine, initial encounter.    Surgical History  He has a past surgical history that includes Foot surgery (10/21/2014).     Allergies  Hydrocodone-acetaminophen    Social History  He reports that he has been smoking cigarettes. He started smoking about 26 years ago. He has a 26.5 pack-year smoking history. He has never used smokeless tobacco. He reports current alcohol use. He reports current drug use. Drug: Marijuana.    Family History  Family History   Problem Relation Name Age of Onset    Pancreatic cancer Paternal Grandmother       Last Recorded Vitals  There were no vitals taken for this visit.    Physical Exam   ***    Relevant Results       Assessment/Plan  {Assess/PlanSmartLinks:41956}    Left inguinal hernia which is reducible, but symptomatic.  Recommend left inguinal herniorrhaphy with mesh either open or robotic assisted laparoscopic.  I discussed the procedure and risks with the patient. The risks include bleeding, infection, mesh infection, recurrence, injury to surrounding structures such as nerves/blood vessels/intestine/bladder, chronic postop pain, cardiac/pulmonary complications.   If the mesh becomes infected it could require excision.  I discussed the alternative of hernia repair without mesh and observation.  The patient wishes to have open repair of the left inguinal hernia with mesh.  Electronic consent has been signed.    Carlos Peraza MD

## 2024-07-18 NOTE — PROGRESS NOTES
This 43-year-old man was seen because of recurrent rectal bleeding.  He tells me he had a colonoscopy several years ago which apparently was negative he has persistent rectal bleeding usually with bowel movements this is thought to be from hemorrhoids he is very worried about it he also wanted to have a colonoscopy because of rectal bleeding and also for colorectal cancer screening.    In addition to this this gentleman also has severe heartburn and epigastric distress.  He does smoke cigarettes as well as use alcohol.    He says inguinal hernia for which she is going to have surgery by Dr. Rogers.    Family history is negative for colorectal cancer but is positive for pancreatic cancer.    A 10 point review of system is positive for generalized anxiety hypertension chronic cough without sputum production no chest pain rectal bleeding a lot of heartburn no dysphagia or weight loss.  No urinary symptoms.    At the time of examination very pleasant but anxious gentleman HEENT is unremarkable oral cavity is dry carotid 2+ JVD is flat thyroid is not enlarged no adenopathy heart sounds were normal chest showed some scattered rhonchi bilaterally abdominal exam is negative he is has a left inguinal hernia which is going to be repaired no other masses are noted no peripheral edema mental status anxious without focal deficits.  Clinical impression gastroesophageal reflux seem to have quite severe symptoms he does take PPI ongoing basis as needed.  He also has recurrent rectal bleeding perhaps from hemorrhoid but should also rule out any colorectal adenomas and will require a colonoscopy aspirin recommendation an upper GI endoscopy and colonoscopy examination    I counseled him regarding the use of tobacco and alcohol and encouraged to quit both.

## 2024-07-22 ENCOUNTER — TELEPHONE (OUTPATIENT)
Dept: GASTROENTEROLOGY | Facility: CLINIC | Age: 44
End: 2024-07-22
Payer: COMMERCIAL

## 2024-07-22 DIAGNOSIS — Z12.11 COLON CANCER SCREENING: Primary | ICD-10-CM

## 2024-07-22 RX ORDER — SODIUM, POTASSIUM,MAG SULFATES 17.5-3.13G
SOLUTION, RECONSTITUTED, ORAL ORAL
Qty: 354 ML | Refills: 0 | Status: SHIPPED | OUTPATIENT
Start: 2024-07-22

## 2024-07-22 NOTE — TELEPHONE ENCOUNTER
Patient called stating they need the prep still sent out for colonoscopy Wednesday 7/24/24 and should be sent to address below thank you   [facility] [address] [place] [address]

## 2024-07-24 ENCOUNTER — HOSPITAL ENCOUNTER (OUTPATIENT)
Dept: GASTROENTEROLOGY | Facility: HOSPITAL | Age: 44
Setting detail: OUTPATIENT SURGERY
Discharge: HOME | End: 2024-07-24
Payer: COMMERCIAL

## 2024-07-24 ENCOUNTER — ANESTHESIA (OUTPATIENT)
Dept: GASTROENTEROLOGY | Facility: HOSPITAL | Age: 44
End: 2024-07-24
Payer: COMMERCIAL

## 2024-07-24 ENCOUNTER — ANESTHESIA EVENT (OUTPATIENT)
Dept: GASTROENTEROLOGY | Facility: HOSPITAL | Age: 44
End: 2024-07-24
Payer: COMMERCIAL

## 2024-07-24 VITALS
OXYGEN SATURATION: 99 % | BODY MASS INDEX: 24.76 KG/M2 | HEART RATE: 78 BPM | TEMPERATURE: 96.8 F | RESPIRATION RATE: 18 BRPM | HEIGHT: 65 IN | DIASTOLIC BLOOD PRESSURE: 92 MMHG | SYSTOLIC BLOOD PRESSURE: 117 MMHG | WEIGHT: 148.59 LBS

## 2024-07-24 DIAGNOSIS — Z12.11 COLON CANCER SCREENING: ICD-10-CM

## 2024-07-24 DIAGNOSIS — K20.90 ESOPHAGITIS: ICD-10-CM

## 2024-07-24 PROCEDURE — 7100000009 HC PHASE TWO TIME - INITIAL BASE CHARGE

## 2024-07-24 PROCEDURE — 2500000004 HC RX 250 GENERAL PHARMACY W/ HCPCS (ALT 636 FOR OP/ED): Performed by: INTERNAL MEDICINE

## 2024-07-24 PROCEDURE — 45378 DIAGNOSTIC COLONOSCOPY: CPT | Performed by: INTERNAL MEDICINE

## 2024-07-24 PROCEDURE — 3700000001 HC GENERAL ANESTHESIA TIME - INITIAL BASE CHARGE

## 2024-07-24 PROCEDURE — 3700000002 HC GENERAL ANESTHESIA TIME - EACH INCREMENTAL 1 MINUTE

## 2024-07-24 PROCEDURE — 43239 EGD BIOPSY SINGLE/MULTIPLE: CPT | Performed by: INTERNAL MEDICINE

## 2024-07-24 PROCEDURE — 2500000005 HC RX 250 GENERAL PHARMACY W/O HCPCS: Performed by: ANESTHESIOLOGIST ASSISTANT

## 2024-07-24 PROCEDURE — A45378 PR COLONOSCOPY,DIAGNOSTIC: Performed by: ANESTHESIOLOGIST ASSISTANT

## 2024-07-24 PROCEDURE — 2500000004 HC RX 250 GENERAL PHARMACY W/ HCPCS (ALT 636 FOR OP/ED): Performed by: ANESTHESIOLOGIST ASSISTANT

## 2024-07-24 PROCEDURE — A45378 PR COLONOSCOPY,DIAGNOSTIC: Performed by: ANESTHESIOLOGY

## 2024-07-24 PROCEDURE — 7100000010 HC PHASE TWO TIME - EACH INCREMENTAL 1 MINUTE

## 2024-07-24 RX ORDER — SODIUM CHLORIDE, SODIUM LACTATE, POTASSIUM CHLORIDE, CALCIUM CHLORIDE 600; 310; 30; 20 MG/100ML; MG/100ML; MG/100ML; MG/100ML
20 INJECTION, SOLUTION INTRAVENOUS CONTINUOUS
Status: DISCONTINUED | OUTPATIENT
Start: 2024-07-24 | End: 2024-07-25 | Stop reason: HOSPADM

## 2024-07-24 RX ORDER — PROPOFOL 10 MG/ML
INJECTION, EMULSION INTRAVENOUS AS NEEDED
Status: DISCONTINUED | OUTPATIENT
Start: 2024-07-24 | End: 2024-07-24

## 2024-07-24 RX ORDER — LIDOCAINE HCL/PF 100 MG/5ML
SYRINGE (ML) INTRAVENOUS AS NEEDED
Status: DISCONTINUED | OUTPATIENT
Start: 2024-07-24 | End: 2024-07-24

## 2024-07-24 SDOH — HEALTH STABILITY: MENTAL HEALTH: CURRENT SMOKER: 1

## 2024-07-24 ASSESSMENT — PAIN - FUNCTIONAL ASSESSMENT
PAIN_FUNCTIONAL_ASSESSMENT: 0-10

## 2024-07-24 ASSESSMENT — COLUMBIA-SUICIDE SEVERITY RATING SCALE - C-SSRS
6. HAVE YOU EVER DONE ANYTHING, STARTED TO DO ANYTHING, OR PREPARED TO DO ANYTHING TO END YOUR LIFE?: NO
2. HAVE YOU ACTUALLY HAD ANY THOUGHTS OF KILLING YOURSELF?: NO
1. IN THE PAST MONTH, HAVE YOU WISHED YOU WERE DEAD OR WISHED YOU COULD GO TO SLEEP AND NOT WAKE UP?: NO

## 2024-07-24 ASSESSMENT — PAIN SCALES - GENERAL
PAINLEVEL_OUTOF10: 0 - NO PAIN
PAIN_LEVEL: 0
PAINLEVEL_OUTOF10: 0 - NO PAIN
PAINLEVEL_OUTOF10: 0 - NO PAIN

## 2024-07-24 NOTE — ANESTHESIA POSTPROCEDURE EVALUATION
Patient: Ariel Andujar    Procedure Summary       Date: 07/24/24 Room / Location: [facility]    Anesthesia Start: 1015 Anesthesia Stop: 1117    Procedures:       COLONOSCOPY      EGD Diagnosis:       Colon cancer screening      Esophagitis    Scheduled Providers: Gee Do MD Responsible Provider: Victor Manuel Tapia MD    Anesthesia Type: MAC ASA Status: 2            Anesthesia Type: MAC    Vitals Value Taken Time   /93 07/24/24 1130   Temp 36 °C (96.8 °F) 07/24/24 1116   Pulse 78 07/24/24 1130   Resp 18 07/24/24 1130   SpO2 96 % 07/24/24 1130       Anesthesia Post Evaluation    Patient location during evaluation: PACU  Patient participation: complete - patient participated  Level of consciousness: awake  Pain score: 0  Pain management: adequate  Airway patency: patent  Cardiovascular status: acceptable and hemodynamically stable  Respiratory status: nasal cannula  Hydration status: acceptable  Postoperative Nausea and Vomiting: none        No notable events documented.

## 2024-07-24 NOTE — DISCHARGE INSTRUCTIONS

## 2024-07-24 NOTE — ANESTHESIA PREPROCEDURE EVALUATION
Patient: Ariel Andujar    Procedure Information       Date/Time: 07/24/24 1000    Scheduled providers: Gee Do MD    Procedures:       COLONOSCOPY      EGD    Location: [facility]            Relevant Problems   Cardiac   (+) Benign essential hypertension   (+) Chest pain   (+) Hypertriglyceridemia      Neuro   (+) Anxiety   (+) Cervical radiculitis      GI   (+) GERD (gastroesophageal reflux disease)   (+) Rectal bleeding      Liver   (+) Hepatomegaly      Musculoskeletal   (+) Chronic neck pain      HEENT   (+) Sinusitis      ID   (+) Viral URI with cough       Clinical information reviewed:    Allergies  Meds               NPO Detail:  NPO/Void Status  Date of Last Liquid: 07/23/24  Time of Last Liquid: 2200  Date of Last Solid: 07/22/24  Time of Last Solid: 1830         Physical Exam    Airway  Mallampati: III  TM distance: <3 FB  Neck ROM: full     Cardiovascular - normal exam  Rhythm: regular  Rate: normal     Dental - normal exam     Pulmonary - normal exam     Abdominal            Anesthesia Plan    History of general anesthesia?: yes  History of complications of general anesthesia?: no    ASA 2     MAC     The patient is a current smoker.  Patient was previously instructed to abstain from smoking on day of procedure.  Patient did not smoke on day of procedure.    intravenous induction   Anesthetic plan and risks discussed with patient.    Plan discussed with CAA.

## 2024-07-29 NOTE — PREPROCEDURE INSTRUCTIONS
Current Medications   Medication Instructions    alclometasone (Aclovate) 0.05 % cream Hold am of surgery    busPIRone (Buspar) 10 mg tablet Take in am with sip of water if needed    valsartan-hydrochlorothiazide (Diovan-HCT) 160-25 mg tablet Hold am of surgery          NPO Instructions: Nothing to eat after midnight, may have 13.5 ounces of a clear liquid two hours prior to arrival to the hospital (completed by 0400). Stop smoking cigarettes and marijuana today and am of surgery.     Additional Instructions: Enter through main entrance of [facility], located at [address] [facility]. Proceed to registration, located in the back right hand corner. You will need your ID and insurance card for registration. Please ensure you have a responsible adult to drive you home.     Take a shower the morning of or night before your procedure. After you shower avoid lotions, powders, deodorants or anything applied to the skin. If you wear contacts or glasses, wear the glasses. If you do not have glasses, please bring a case for your contacts. You may wear hearing aids and dentures, bring a case for them or we will provide one. Make sure you wear something loose and comfortable. Keep in mind your surgery type and wear something that will accommodate incisions or bandages. Please remove all jewelry.     For further questions Familia MEYER can be contacted at [phone] between 7AM-3PM.

## 2024-07-30 ENCOUNTER — ANESTHESIA EVENT (OUTPATIENT)
Dept: OPERATING ROOM | Facility: HOSPITAL | Age: 44
End: 2024-07-30
Payer: COMMERCIAL

## 2024-07-30 ENCOUNTER — ANESTHESIA (OUTPATIENT)
Dept: OPERATING ROOM | Facility: HOSPITAL | Age: 44
End: 2024-07-30
Payer: COMMERCIAL

## 2024-07-30 ENCOUNTER — HOSPITAL ENCOUNTER (OUTPATIENT)
Facility: HOSPITAL | Age: 44
Setting detail: OUTPATIENT SURGERY
Discharge: HOME | End: 2024-07-30
Attending: SURGERY | Admitting: SURGERY
Payer: COMMERCIAL

## 2024-07-30 VITALS
HEIGHT: 65 IN | TEMPERATURE: 96.8 F | WEIGHT: 148.59 LBS | RESPIRATION RATE: 16 BRPM | DIASTOLIC BLOOD PRESSURE: 84 MMHG | OXYGEN SATURATION: 96 % | HEART RATE: 64 BPM | SYSTOLIC BLOOD PRESSURE: 118 MMHG | BODY MASS INDEX: 24.76 KG/M2

## 2024-07-30 DIAGNOSIS — K40.90 LEFT INGUINAL HERNIA: Primary | ICD-10-CM

## 2024-07-30 PROBLEM — Z86.79 HISTORY OF HYPERTENSION: Status: ACTIVE | Noted: 2024-07-30

## 2024-07-30 PROBLEM — K92.2 GASTROINTESTINAL HEMORRHAGE: Status: ACTIVE | Noted: 2024-07-30

## 2024-07-30 PROCEDURE — 7100000002 HC RECOVERY ROOM TIME - EACH INCREMENTAL 1 MINUTE: Performed by: SURGERY

## 2024-07-30 PROCEDURE — 7100000010 HC PHASE TWO TIME - EACH INCREMENTAL 1 MINUTE: Performed by: SURGERY

## 2024-07-30 PROCEDURE — A49505 PR REPAIR ING HERNIA,5+Y/O,REDUCIBL: Performed by: ANESTHESIOLOGIST ASSISTANT

## 2024-07-30 PROCEDURE — 2500000004 HC RX 250 GENERAL PHARMACY W/ HCPCS (ALT 636 FOR OP/ED): Performed by: ANESTHESIOLOGY

## 2024-07-30 PROCEDURE — 93005 ELECTROCARDIOGRAM TRACING: CPT | Mod: 59

## 2024-07-30 PROCEDURE — 2720000007 HC OR 272 NO HCPCS: Performed by: SURGERY

## 2024-07-30 PROCEDURE — 7100000009 HC PHASE TWO TIME - INITIAL BASE CHARGE: Performed by: SURGERY

## 2024-07-30 PROCEDURE — 3700000002 HC GENERAL ANESTHESIA TIME - EACH INCREMENTAL 1 MINUTE: Performed by: SURGERY

## 2024-07-30 PROCEDURE — 49505 PRP I/HERN INIT REDUC >5 YR: CPT | Performed by: SURGERY

## 2024-07-30 PROCEDURE — 0751T DGTZ GLS MCRSCP SLD LEVEL II: CPT | Mod: TC,PARLAB | Performed by: SURGERY

## 2024-07-30 PROCEDURE — 93010 ELECTROCARDIOGRAM REPORT: CPT | Performed by: STUDENT IN AN ORGANIZED HEALTH CARE EDUCATION/TRAINING PROGRAM

## 2024-07-30 PROCEDURE — C1781 MESH (IMPLANTABLE): HCPCS | Performed by: SURGERY

## 2024-07-30 PROCEDURE — 2500000004 HC RX 250 GENERAL PHARMACY W/ HCPCS (ALT 636 FOR OP/ED): Mod: JZ | Performed by: SURGERY

## 2024-07-30 PROCEDURE — 7100000001 HC RECOVERY ROOM TIME - INITIAL BASE CHARGE: Performed by: SURGERY

## 2024-07-30 PROCEDURE — 3700000001 HC GENERAL ANESTHESIA TIME - INITIAL BASE CHARGE: Performed by: SURGERY

## 2024-07-30 PROCEDURE — 2500000005 HC RX 250 GENERAL PHARMACY W/O HCPCS: Performed by: ANESTHESIOLOGIST ASSISTANT

## 2024-07-30 PROCEDURE — 3600000008 HC OR TIME - EACH INCREMENTAL 1 MINUTE - PROCEDURE LEVEL THREE: Performed by: SURGERY

## 2024-07-30 PROCEDURE — A49505 PR REPAIR ING HERNIA,5+Y/O,REDUCIBL: Performed by: ANESTHESIOLOGY

## 2024-07-30 PROCEDURE — 2500000005 HC RX 250 GENERAL PHARMACY W/O HCPCS: Performed by: SURGERY

## 2024-07-30 PROCEDURE — 2500000004 HC RX 250 GENERAL PHARMACY W/ HCPCS (ALT 636 FOR OP/ED): Performed by: SURGERY

## 2024-07-30 PROCEDURE — 3600000003 HC OR TIME - INITIAL BASE CHARGE - PROCEDURE LEVEL THREE: Performed by: SURGERY

## 2024-07-30 PROCEDURE — 2780000003 HC OR 278 NO HCPCS: Performed by: SURGERY

## 2024-07-30 PROCEDURE — 2500000004 HC RX 250 GENERAL PHARMACY W/ HCPCS (ALT 636 FOR OP/ED): Performed by: ANESTHESIOLOGIST ASSISTANT

## 2024-07-30 DEVICE — BARD SOFT MESH
Type: IMPLANTABLE DEVICE | Site: INGUINAL | Status: FUNCTIONAL
Brand: BARD SOFT MESH

## 2024-07-30 RX ORDER — DEXAMETHASONE SODIUM PHOSPHATE 10 MG/ML
6 INJECTION INTRAMUSCULAR; INTRAVENOUS ONCE
Status: DISCONTINUED | OUTPATIENT
Start: 2024-07-30 | End: 2024-07-30 | Stop reason: HOSPADM

## 2024-07-30 RX ORDER — SODIUM CHLORIDE, SODIUM LACTATE, POTASSIUM CHLORIDE, CALCIUM CHLORIDE 600; 310; 30; 20 MG/100ML; MG/100ML; MG/100ML; MG/100ML
100 INJECTION, SOLUTION INTRAVENOUS CONTINUOUS
Status: DISCONTINUED | OUTPATIENT
Start: 2024-07-30 | End: 2024-07-30 | Stop reason: HOSPADM

## 2024-07-30 RX ORDER — MIDAZOLAM HYDROCHLORIDE 1 MG/ML
INJECTION, SOLUTION INTRAMUSCULAR; INTRAVENOUS AS NEEDED
Status: DISCONTINUED | OUTPATIENT
Start: 2024-07-30 | End: 2024-07-30

## 2024-07-30 RX ORDER — KETOROLAC TROMETHAMINE 30 MG/ML
INJECTION, SOLUTION INTRAMUSCULAR; INTRAVENOUS AS NEEDED
Status: DISCONTINUED | OUTPATIENT
Start: 2024-07-30 | End: 2024-07-30

## 2024-07-30 RX ORDER — GABAPENTIN 300 MG/1
300 CAPSULE ORAL 3 TIMES DAILY PRN
Qty: 15 CAPSULE | Refills: 0 | Status: SHIPPED | OUTPATIENT
Start: 2024-07-30

## 2024-07-30 RX ORDER — OXYCODONE HYDROCHLORIDE 5 MG/1
5 TABLET ORAL EVERY 6 HOURS PRN
Qty: 8 TABLET | Refills: 0 | Status: SHIPPED | OUTPATIENT
Start: 2024-07-30

## 2024-07-30 RX ORDER — LIDOCAINE HYDROCHLORIDE 10 MG/ML
INJECTION INFILTRATION; PERINEURAL AS NEEDED
Status: DISCONTINUED | OUTPATIENT
Start: 2024-07-30 | End: 2024-07-30 | Stop reason: HOSPADM

## 2024-07-30 RX ORDER — FENTANYL CITRATE 50 UG/ML
INJECTION, SOLUTION INTRAMUSCULAR; INTRAVENOUS AS NEEDED
Status: DISCONTINUED | OUTPATIENT
Start: 2024-07-30 | End: 2024-07-30

## 2024-07-30 RX ORDER — IPRATROPIUM BROMIDE 0.5 MG/2.5ML
500 SOLUTION RESPIRATORY (INHALATION) ONCE
Status: DISCONTINUED | OUTPATIENT
Start: 2024-07-30 | End: 2024-07-30 | Stop reason: HOSPADM

## 2024-07-30 RX ORDER — ACETAMINOPHEN 325 MG/1
650 TABLET ORAL EVERY 4 HOURS PRN
Status: DISCONTINUED | OUTPATIENT
Start: 2024-07-30 | End: 2024-07-30 | Stop reason: HOSPADM

## 2024-07-30 RX ORDER — ONDANSETRON HYDROCHLORIDE 2 MG/ML
4 INJECTION, SOLUTION INTRAVENOUS ONCE AS NEEDED
Status: DISCONTINUED | OUTPATIENT
Start: 2024-07-30 | End: 2024-07-30 | Stop reason: HOSPADM

## 2024-07-30 RX ORDER — PROPOFOL 10 MG/ML
INJECTION, EMULSION INTRAVENOUS AS NEEDED
Status: DISCONTINUED | OUTPATIENT
Start: 2024-07-30 | End: 2024-07-30

## 2024-07-30 RX ORDER — BUPIVACAINE HYDROCHLORIDE 5 MG/ML
INJECTION, SOLUTION EPIDURAL; INTRACAUDAL AS NEEDED
Status: DISCONTINUED | OUTPATIENT
Start: 2024-07-30 | End: 2024-07-30 | Stop reason: HOSPADM

## 2024-07-30 RX ORDER — MEPERIDINE HYDROCHLORIDE 25 MG/ML
12.5 INJECTION INTRAMUSCULAR; INTRAVENOUS; SUBCUTANEOUS EVERY 10 MIN PRN
Status: DISCONTINUED | OUTPATIENT
Start: 2024-07-30 | End: 2024-07-30 | Stop reason: HOSPADM

## 2024-07-30 RX ORDER — CEFAZOLIN SODIUM 2 G/100ML
2 INJECTION, SOLUTION INTRAVENOUS ONCE
Status: COMPLETED | OUTPATIENT
Start: 2024-07-30 | End: 2024-07-30

## 2024-07-30 RX ORDER — LIDOCAINE HYDROCHLORIDE 10 MG/ML
0.1 INJECTION INFILTRATION; PERINEURAL ONCE
Status: DISCONTINUED | OUTPATIENT
Start: 2024-07-30 | End: 2024-07-30 | Stop reason: HOSPADM

## 2024-07-30 RX ORDER — SODIUM CHLORIDE 0.9 G/100ML
IRRIGANT IRRIGATION AS NEEDED
Status: DISCONTINUED | OUTPATIENT
Start: 2024-07-30 | End: 2024-07-30 | Stop reason: HOSPADM

## 2024-07-30 RX ORDER — DIPHENHYDRAMINE HYDROCHLORIDE 50 MG/ML
12.5 INJECTION INTRAMUSCULAR; INTRAVENOUS ONCE AS NEEDED
Status: DISCONTINUED | OUTPATIENT
Start: 2024-07-30 | End: 2024-07-30 | Stop reason: HOSPADM

## 2024-07-30 RX ORDER — KETOROLAC TROMETHAMINE 30 MG/ML
30 INJECTION, SOLUTION INTRAMUSCULAR; INTRAVENOUS ONCE AS NEEDED
Status: DISCONTINUED | OUTPATIENT
Start: 2024-07-30 | End: 2024-07-30 | Stop reason: HOSPADM

## 2024-07-30 RX ORDER — MEPERIDINE HYDROCHLORIDE 25 MG/ML
INJECTION INTRAMUSCULAR; INTRAVENOUS; SUBCUTANEOUS
Status: DISCONTINUED
Start: 2024-07-30 | End: 2024-07-30 | Stop reason: WASHOUT

## 2024-07-30 RX ORDER — LIDOCAINE HCL/PF 100 MG/5ML
SYRINGE (ML) INTRAVENOUS AS NEEDED
Status: DISCONTINUED | OUTPATIENT
Start: 2024-07-30 | End: 2024-07-30

## 2024-07-30 RX ORDER — ALBUTEROL SULFATE 0.83 MG/ML
2.5 SOLUTION RESPIRATORY (INHALATION) ONCE AS NEEDED
Status: DISCONTINUED | OUTPATIENT
Start: 2024-07-30 | End: 2024-07-30 | Stop reason: HOSPADM

## 2024-07-30 RX ORDER — ONDANSETRON HYDROCHLORIDE 2 MG/ML
INJECTION, SOLUTION INTRAVENOUS AS NEEDED
Status: DISCONTINUED | OUTPATIENT
Start: 2024-07-30 | End: 2024-07-30

## 2024-07-30 SDOH — HEALTH STABILITY: MENTAL HEALTH: CURRENT SMOKER: 1

## 2024-07-30 ASSESSMENT — PAIN SCALES - GENERAL
PAINLEVEL_OUTOF10: 8
PAINLEVEL_OUTOF10: 7
PAIN_LEVEL: 0
PAINLEVEL_OUTOF10: 0 - NO PAIN
PAINLEVEL_OUTOF10: 6

## 2024-07-30 ASSESSMENT — PAIN - FUNCTIONAL ASSESSMENT
PAIN_FUNCTIONAL_ASSESSMENT: 0-10

## 2024-07-30 ASSESSMENT — PAIN DESCRIPTION - DESCRIPTORS
DESCRIPTORS: ACHING;BURNING;SHOOTING
DESCRIPTORS: SHOOTING

## 2024-07-30 NOTE — OP NOTE
LEFT INGUINAL HERNIA OPEN REPAIR WITH MESH (L) Operative Note     Date: 2024  OR Location: [facility]    Name: Ariel Andujar, : 1980, Age: 44 y.o., MRN: [id number], Sex: male    Diagnosis  Pre-op Diagnosis      * Left inguinal hernia [K40.90] Post-op Diagnosis     * Left inguinal hernia [K40.90]     Procedures  LEFT INGUINAL HERNIA OPEN REPAIR WITH MESH  [address] - DE RPR 1ST INGUN HRNA AGE 5 YRS/> REDUCIBLE      Surgeons      * Carlos Peraza - Primary    Resident/Fellow/Other Assistant:  Surgeons and Role:  * No surgeons found with a matching role *    Procedure Summary  Anesthesia: Monitor Anesthesia Care  ASA: II  Anesthesia Staff: Anesthesiologist: Victor Manuel Tapia MD  C-AA: TARAH Ward  Estimated Blood Loss: 0mL  Intra-op Medications:   Administrations occurring from 0730 to 0935 on 24:   Medication Name Total Dose   bupivacaine PF (Marcaine) 0.5 % (5 mg/mL) injection 10 mL   sodium chloride 0.9 % irrigation solution 500 mL   ceFAZolin (Ancef) 2 g in dextrose (iso)  mL 2 g              Anesthesia Record               Intraprocedure I/O Totals       None           Specimen:   ID Type Source Tests Collected by Time   1 : HERNIA SAC Tissue HERNIA SAC SURGICAL PATHOLOGY EXAM Carlos Peraza MD 2024 0809   2 : CHORD LIPOMA Tissue LIPOMA SURGICAL PATHOLOGY EXAM Carlos Peraza MD 2024 0810        Staff:   Sanjivulator: Kristel  Circulator: Laurie Lopez Person: Sushil         Drains and/or Catheters: * None in log *    Tourniquet Times:         Implants:  Implants       Type Name Action Serial No.      Surgical Mesh Sling Implant MESH, SURGICAL, SOFT, POLYPROPYLENE 4 X 6 - [id number] Implanted               Findings: Left indirect inguinal hernia and cord lipoma    Indications: Ariel Andujar is an 44 y.o. male who is having surgery for Left inguinal hernia [K40.90].  Symptomatic inguinal hernia    The patient was seen in the preoperative area. The risks, benefits,  complications, treatment options, non-operative alternatives, expected recovery and outcomes were discussed with the patient. The possibilities of reaction to medication, pulmonary aspiration, injury to surrounding structures, bleeding, recurrent infection, the need for additional procedures, failure to diagnose a condition, and creating a complication requiring transfusion or operation were discussed with the patient. The patient concurred with the proposed plan, giving informed consent.  The site of surgery was properly noted/marked if necessary per policy. The patient has been actively warmed in preoperative area. Preoperative antibiotics have been ordered and given within 1 hours of incision. Venous thrombosis prophylaxis have been ordered including bilateral sequential compression devices    Procedure Details: Following informed consent the patient was taken to the operating room and placed in supine position.  A huddle was performed.  The patient was given MAC anesthetic.  Sequential compression devices were in place and functioning.  The patient received Ancef IV in the operating room.  The abdomen was prepped and draped in sterile fashion.  A timeout was performed.  Following infiltration of local anesthetic a [left] inguinal incision was made along the skin lines and extended through the subcutaneous tissue.  The external oblique aponeurosis was incised along the lines of its fibers to the external inguinal ring medially.  The ilioinguinal nerve was identified and carefully preserved.  The spermatic cord was freed from surrounding tissue and a Penrose drain placed around it for retraction.  The anteromedial portion of the spermatic cord was dissected.  The patient was found to have [an indirect inguinal hernia and cord lipoma.  The hernia sac was opened and freed from surrounding tissue.  A high ligation was performed with a 2-0 Vicryl suture ligature and the excess hernia sac amputated.  The cord lipoma  was freed from surrounding tissue and suture-ligated at its base and divided.] .  A 10 x 15 cm diameter polypropylene mesh was cut to the appropriate size and secured circumferentially with 2-0 Prolene suture.  A running suture was used to approximate the mesh to Poupart's ligament beginning medial to the pubic tubercle and extending lateral to the internal inguinal ring.  Laterally the mesh was divided and the 2 legs of the mesh placed around the spermatic cord at the internal inguinal ring.  Medially and superiorly the mesh was secured to the internal oblique and transversus abdominis with interrupted sutures.  Laterally the 2 legs of the mesh was secured to each other and to Poupart's ligament with interrupted sutures.  The external oblique aponeurosis was reapproximated with a running 3-0 Vicryl suture.  Half percent plain Marcaine was infiltrated.  The subcutaneous tissue was closed with interrupted 3-0 chromic sutures.  Skin was closed with a running 4-0 Vicryl subcuticular stitch.  Dressings were placed and the patient was taken to the recovery room in satisfactory condition having tolerated the procedure well.  Counts were correct.  Complications:  None; patient tolerated the procedure well.    Disposition: PACU - hemodynamically stable.  Condition: stable         Carlos Sotoner  Phone Number: [phone]

## 2024-07-30 NOTE — ANESTHESIA PREPROCEDURE EVALUATION
Patient: Ariel Andujar    Procedure Information       Date/Time: 07/30/24 0730    Procedure: LEFT INGUINAL HERNIA OPEN REPAIR WITH MESH (Left)    Location: [facility] 08 / [facility]    Surgeons: Carlos Peraza MD            Relevant Problems   Cardiac   (+) Benign essential hypertension   (+) Chest pain   (+) Hypertriglyceridemia      Neuro   (+) Anxiety   (+) Cervical radiculitis      GI   (+) GERD (gastroesophageal reflux disease)   (+) Gastrointestinal hemorrhage   (+) Rectal bleeding      Liver   (+) Hepatomegaly      Musculoskeletal   (+) Chronic neck pain      HEENT   (+) Sinusitis      ID   (+) Viral URI with cough       Clinical information reviewed:    Allergies  Meds               NPO Detail:  No data recorded     Physical Exam    Airway  Mallampati: III  TM distance: <3 FB  Neck ROM: full     Cardiovascular - normal exam  Rhythm: regular  Rate: normal     Dental - normal exam     Pulmonary - normal exam     Abdominal            Anesthesia Plan    History of general anesthesia?: yes  History of complications of general anesthesia?: no    ASA 2     MAC     The patient is a current smoker.  Patient was previously instructed to abstain from smoking on day of procedure.  Patient smoked on day of procedure.    intravenous induction   Postoperative administration of opioids is intended.  Trial extubation is planned.  Anesthetic plan and risks discussed with patient.    Plan discussed with CAA.

## 2024-07-30 NOTE — ANESTHESIA POSTPROCEDURE EVALUATION
Patient: Ariel Andujar    Procedure Summary       Date: 07/30/24 Room / Location: [facility] 08 / [facility]    Anesthesia Start: 0730 Anesthesia Stop: 0852    Procedure: LEFT INGUINAL HERNIA OPEN REPAIR WITH MESH (Left: Abdomen) Diagnosis:       Left inguinal hernia      (Left inguinal hernia [K40.90])    Surgeons: Carlos Peraza MD Responsible Provider: Victor Manuel Tapia MD    Anesthesia Type: MAC ASA Status: 2            Anesthesia Type: MAC    Vitals Value Taken Time   /69 07/30/24 0852   Temp 36 °C (96.8 °F) 07/30/24 0852   Pulse 71 07/30/24 0902   Resp 16 07/30/24 0852   SpO2 94 % 07/30/24 0902   Vitals shown include unfiled device data.    Anesthesia Post Evaluation    Patient location during evaluation: PACU  Patient participation: complete - patient participated  Level of consciousness: awake  Pain score: 0  Pain management: adequate  Airway patency: patent  Cardiovascular status: acceptable and hemodynamically stable  Respiratory status: acceptable and nasal cannula  Hydration status: acceptable  Postoperative Nausea and Vomiting: none        No notable events documented.

## 2024-07-31 LAB
ATRIAL RATE: 58 BPM
P AXIS: 21 DEGREES
P OFFSET: 185 MS
P ONSET: 142 MS
PR INTERVAL: 154 MS
Q ONSET: 219 MS
QRS COUNT: 10 BEATS
QRS DURATION: 116 MS
QT INTERVAL: 412 MS
QTC CALCULATION(BAZETT): 404 MS
QTC FREDERICIA: 407 MS
R AXIS: 75 DEGREES
T AXIS: 54 DEGREES
T OFFSET: 425 MS
VENTRICULAR RATE: 58 BPM

## 2024-08-02 LAB
LABORATORY COMMENT REPORT: NORMAL
PATH REPORT.FINAL DX SPEC: NORMAL
PATH REPORT.GROSS SPEC: NORMAL
PATH REPORT.RELEVANT HX SPEC: NORMAL
PATH REPORT.TOTAL CANCER: NORMAL

## 2024-08-03 ENCOUNTER — HOSPITAL ENCOUNTER (OUTPATIENT)
Dept: RADIOLOGY | Facility: HOSPITAL | Age: 44
Discharge: HOME | End: 2024-08-03
Payer: COMMERCIAL

## 2024-08-03 DIAGNOSIS — E78.5 HYPERLIPIDEMIA, UNSPECIFIED HYPERLIPIDEMIA TYPE: ICD-10-CM

## 2024-08-03 PROCEDURE — 75571 CT HRT W/O DYE W/CA TEST: CPT

## 2024-08-09 PROBLEM — Z09 POSTOP CHECK: Status: ACTIVE | Noted: 2024-08-09

## 2024-08-12 ENCOUNTER — APPOINTMENT (OUTPATIENT)
Dept: SURGERY | Facility: CLINIC | Age: 44
End: 2024-08-12
Payer: COMMERCIAL

## 2024-08-12 VITALS
WEIGHT: 145.6 LBS | OXYGEN SATURATION: 95 % | DIASTOLIC BLOOD PRESSURE: 88 MMHG | HEIGHT: 65 IN | HEART RATE: 95 BPM | BODY MASS INDEX: 24.26 KG/M2 | SYSTOLIC BLOOD PRESSURE: 135 MMHG | RESPIRATION RATE: 16 BRPM | TEMPERATURE: 98.1 F

## 2024-08-12 DIAGNOSIS — Z09 POSTOP CHECK: Primary | ICD-10-CM

## 2024-08-12 PROCEDURE — 3008F BODY MASS INDEX DOCD: CPT | Performed by: SURGERY

## 2024-08-12 PROCEDURE — 3079F DIAST BP 80-89 MM HG: CPT | Performed by: SURGERY

## 2024-08-12 PROCEDURE — 99024 POSTOP FOLLOW-UP VISIT: CPT | Performed by: SURGERY

## 2024-08-12 PROCEDURE — 3075F SYST BP GE 130 - 139MM HG: CPT | Performed by: SURGERY

## 2024-08-12 NOTE — LETTER
"August 12, 2024     Harley Garnica DO  [address]  [address]  [facility] [address]    Patient: Ariel Andujar   YOB: 1980   Date of Visit: 8/12/2024       Dear Dr. Harley Garnica DO:    Thank you for referring Ariel Andujar to me for evaluation. Below are my notes for this consultation.  If you have questions, please do not hesitate to call me. I look forward to following your patient along with you.       Sincerely,     Carlos Peraza MD      CC: No Recipients  ______________________________________________________________________________________    History Of Present Illness  Ariel Andujar is a 44 y.o. male status post open repair of left indirect inguinal hernia with mesh on July 30, 2024.  The patient had a left cord lipoma which was excised.  He returned to normal daily activity in 5 or 6 days.  He took 7 oxycodone.  He has minimal discomfort at this time.  No other complaints.     Last Recorded Vitals  Blood pressure 135/88, pulse 95, temperature 36.7 °C (98.1 °F), temperature source Temporal, resp. rate 16, height 1.651 m (5' 5\"), weight 66 kg (145 lb 9.6 oz), SpO2 95%.    Physical Exam  Well-developed, well-nourished, no acute distress, alert and oriented  Left inguinal incision: Wound intact and shows no signs of infection.      Assessment/Plan  Diagnoses and all orders for this visit:  Postop check    Recovering well.  Follow-up as needed.    Carlos Peraza MD    "

## 2024-08-15 ENCOUNTER — APPOINTMENT (OUTPATIENT)
Dept: PRIMARY CARE | Facility: CLINIC | Age: 44
End: 2024-08-15
Payer: COMMERCIAL

## 2024-08-15 PROBLEM — M67.40 GANGLION CYST: Status: ACTIVE | Noted: 2024-08-15

## 2024-08-15 PROBLEM — S62.338A CLOSED FRACTURE OF NECK OF FIFTH METACARPAL BONE: Status: ACTIVE | Noted: 2024-05-01

## 2024-08-15 PROBLEM — Z86.69 HISTORY OF MIGRAINE: Status: ACTIVE | Noted: 2024-07-30

## 2024-08-15 PROBLEM — S39.012A LUMBOSACRAL STRAIN: Status: ACTIVE | Noted: 2024-08-15

## 2024-08-15 PROBLEM — M25.569 KNEE PAIN: Status: ACTIVE | Noted: 2024-08-15

## 2024-08-15 PROBLEM — H65.00 ACUTE SEROUS OTITIS MEDIA: Status: ACTIVE | Noted: 2024-08-15

## 2024-08-15 PROBLEM — S29.9XXA INJURY OF CHEST WALL: Status: ACTIVE | Noted: 2024-08-15

## 2024-08-15 PROBLEM — K52.9 COLITIS: Status: ACTIVE | Noted: 2024-05-01

## 2024-09-03 DIAGNOSIS — K40.90 LEFT INGUINAL HERNIA: ICD-10-CM

## 2024-09-05 RX ORDER — GABAPENTIN 300 MG/1
CAPSULE ORAL
Qty: 15 CAPSULE | Refills: 0 | OUTPATIENT
Start: 2024-09-05

## 2024-09-19 ENCOUNTER — TELEPHONE (OUTPATIENT)
Dept: GASTROENTEROLOGY | Facility: HOSPITAL | Age: 44
End: 2024-09-19
Payer: COMMERCIAL

## 2024-09-19 NOTE — TELEPHONE ENCOUNTER
Gastric and small bowel biopsies were negative discussed with the patient.  I had called him earlier without success

## 2024-11-12 ENCOUNTER — APPOINTMENT (OUTPATIENT)
Dept: NEUROLOGY | Facility: HOSPITAL | Age: 44
End: 2024-11-12
Payer: COMMERCIAL

## 2024-12-03 NOTE — PROGRESS NOTES
History Of Present Illness  HPI    May 8, 2024  The patient is a 43-year-old male referred by his primary care physician for a left inguinal hernia.  The patient noticed a left inguinal lump about 4 months ago.  The lump has been reducible.  He has mild intermittent discomfort.  No prior hernia repair.     July 15, 2024  The patient was going to wait until October to have his hernia repaired, but now has worsening left inguinal burning pain and wishes to schedule the operation sooner.  He has hypertension for which he was recently started on antihypertensive medications.    December 4, 2024  The patient had open repair of a left indirect inguinal hernia with excision of cord lipoma on July 30, 2024.  He followed up on August 12, 2024 at which time he was recovering well and had minimal discomfort.  A couple weeks ago he developed some soreness at the incision and a portion of the wound became red.  This is now improved and he now has just a minimal dull ache in the area.  The redness of the wound has resolved.    Past medical history:  Right knee torn meniscus  Foot operation     Tobacco: 1 pack/day.  I advised him to stop smoking.  Allergies: Vicodin twice caused him to pass out.    Past Medical History  He has a past medical history of Anxiety disorder, unspecified, Encounter for other general counseling and advice on contraception, Ganglion, unspecified site, Other conditions influencing health status, Other conditions influencing health status, Pain in unspecified knee, Personal history of other diseases of male genital organs, Personal history of other diseases of the circulatory system, Personal history of other diseases of the digestive system, Personal history of other diseases of the digestive system, Personal history of other diseases of the musculoskeletal system and connective tissue, Personal history of other diseases of the nervous system and sense organs, Personal history of other diseases of the  "respiratory system, Personal history of other mental and behavioral disorders, Personal history of other specified conditions, Personal history of other specified conditions, and Sprain of ligaments of thoracic spine, initial encounter.    Surgical History  He has a past surgical history that includes Foot surgery (10/21/2014).     Allergies  Vicodin [hydrocodone-acetaminophen]    Social History  He reports that he has been smoking cigarettes. He started smoking about 26 years ago. He has a 26.9 pack-year smoking history. He has never used smokeless tobacco. He reports current alcohol use. He reports current drug use. Drug: Marijuana.    Family History  Family History   Problem Relation Name Age of Onset    Pancreatic cancer Paternal Grandmother           Last Recorded Vitals  Blood pressure (!) 148/106, pulse 96, temperature 36.8 °C (98.3 °F), temperature source Temporal, resp. rate 18, height 1.651 m (5' 5\"), weight 67.4 kg (148 lb 9.6 oz), SpO2 97%.    Physical Exam  Well-developed, well-nourished, no acute distress, alert and oriented  Abdomen: Left inguinal wound has no erythema or tenderness.  No recurrent hernia palpable.    Relevant Results       Assessment/Plan   Diagnoses and all orders for this visit:  Left inguinal pain    No evidence of wound infection or recurrent hernia by examination.  Patient soreness has improved.  He may have had an area of folliculitis causing his symptoms.  Recommend observation.  Follow-up as needed.  Patient will recheck his blood pressure at home and if it remains elevated he will notify his primary care physician.        Carlos Peraza MD  "

## 2024-12-04 ENCOUNTER — APPOINTMENT (OUTPATIENT)
Dept: SURGERY | Facility: CLINIC | Age: 44
End: 2024-12-04
Payer: COMMERCIAL

## 2024-12-04 VITALS
RESPIRATION RATE: 18 BRPM | HEIGHT: 65 IN | TEMPERATURE: 98.3 F | HEART RATE: 96 BPM | SYSTOLIC BLOOD PRESSURE: 148 MMHG | OXYGEN SATURATION: 97 % | DIASTOLIC BLOOD PRESSURE: 106 MMHG | BODY MASS INDEX: 24.76 KG/M2 | WEIGHT: 148.6 LBS

## 2024-12-04 DIAGNOSIS — R10.32 LEFT INGUINAL PAIN: Primary | ICD-10-CM

## 2024-12-04 PROCEDURE — 99212 OFFICE O/P EST SF 10 MIN: CPT | Performed by: SURGERY

## 2024-12-04 PROCEDURE — 3077F SYST BP >= 140 MM HG: CPT | Performed by: SURGERY

## 2024-12-04 PROCEDURE — 3080F DIAST BP >= 90 MM HG: CPT | Performed by: SURGERY

## 2024-12-04 PROCEDURE — 3008F BODY MASS INDEX DOCD: CPT | Performed by: SURGERY

## 2024-12-04 ASSESSMENT — PAIN SCALES - GENERAL: PAINLEVEL_OUTOF10: 2

## 2024-12-04 NOTE — LETTER
December 4, 2024     Harley Garnica DO  [address]  [address]  [facility] [address]    Patient: Ariel Andujar   YOB: 1980   Date of Visit: 12/4/2024       Dear Dr. Harley Garnica DO:    Thank you for referring Ariel Andujar to me for evaluation. Below are my notes for this consultation.  If you have questions, please do not hesitate to call me. I look forward to following your patient along with you.       Sincerely,     Carlos Peraza MD      CC: No Recipients  ______________________________________________________________________________________    History Of Present Illness  HPI    May 8, 2024  The patient is a 43-year-old male referred by his primary care physician for a left inguinal hernia.  The patient noticed a left inguinal lump about 4 months ago.  The lump has been reducible.  He has mild intermittent discomfort.  No prior hernia repair.     July 15, 2024  The patient was going to wait until October to have his hernia repaired, but now has worsening left inguinal burning pain and wishes to schedule the operation sooner.  He has hypertension for which he was recently started on antihypertensive medications.    December 4, 2024  The patient had open repair of a left indirect inguinal hernia with excision of cord lipoma on July 30, 2024.  He followed up on August 12, 2024 at which time he was recovering well and had minimal discomfort.  A couple weeks ago he developed some soreness at the incision and a portion of the wound became red.  This is now improved and he now has just a minimal dull ache in the area.  The redness of the wound has resolved.    Past medical history:  Right knee torn meniscus  Foot operation     Tobacco: 1 pack/day.  I advised him to stop smoking.  Allergies: Vicodin twice caused him to pass out.    Past Medical History  He has a past medical history of Anxiety disorder, unspecified, Encounter for other general counseling and advice on  "contraception, Ganglion, unspecified site, Other conditions influencing health status, Other conditions influencing health status, Pain in unspecified knee, Personal history of other diseases of male genital organs, Personal history of other diseases of the circulatory system, Personal history of other diseases of the digestive system, Personal history of other diseases of the digestive system, Personal history of other diseases of the musculoskeletal system and connective tissue, Personal history of other diseases of the nervous system and sense organs, Personal history of other diseases of the respiratory system, Personal history of other mental and behavioral disorders, Personal history of other specified conditions, Personal history of other specified conditions, and Sprain of ligaments of thoracic spine, initial encounter.    Surgical History  He has a past surgical history that includes Foot surgery (10/21/2014).     Allergies  Vicodin [hydrocodone-acetaminophen]    Social History  He reports that he has been smoking cigarettes. He started smoking about 26 years ago. He has a 26.9 pack-year smoking history. He has never used smokeless tobacco. He reports current alcohol use. He reports current drug use. Drug: Marijuana.    Family History  Family History   Problem Relation Name Age of Onset   • Pancreatic cancer Paternal Grandmother           Last Recorded Vitals  Blood pressure (!) 148/106, pulse 96, temperature 36.8 °C (98.3 °F), temperature source Temporal, resp. rate 18, height 1.651 m (5' 5\"), weight 67.4 kg (148 lb 9.6 oz), SpO2 97%.    Physical Exam  Well-developed, well-nourished, no acute distress, alert and oriented  Abdomen: Left inguinal wound has no erythema or tenderness.  No recurrent hernia palpable.    Relevant Results       Assessment/Plan  Diagnoses and all orders for this visit:  Left inguinal pain    No evidence of wound infection or recurrent hernia by examination.  Patient soreness has " improved.  He may have had an area of folliculitis causing his symptoms.  Recommend observation.  Follow-up as needed.  Patient will recheck his blood pressure at home and if it remains elevated he will notify his primary care physician.        Carlos Peraza MD

## (undated) DEVICE — SLEEVE, VASO PRESS, CALF GARMENT, MEDIUM, GREEN

## (undated) DEVICE — DRESSING, TRANSPARENT, TEGADERM, 4 X 4-3/4 IN, NO LABEL

## (undated) DEVICE — DRAIN, PENROSE, 0.25 X 18 IN, LATEX, STERILE

## (undated) DEVICE — DRAPE, INCISE, ANTIMICROBIAL, IOBAN 2, LARGE, 17 X 23 IN, DISPOSABLE, STERILE

## (undated) DEVICE — Device

## (undated) DEVICE — DRESSING, TELFA, 3X4